# Patient Record
Sex: MALE | URBAN - METROPOLITAN AREA
[De-identification: names, ages, dates, MRNs, and addresses within clinical notes are randomized per-mention and may not be internally consistent; named-entity substitution may affect disease eponyms.]

---

## 2018-03-13 ENCOUNTER — TELEPHONE (OUTPATIENT)
Dept: FAMILY MEDICINE | Facility: CLINIC | Age: 53
End: 2018-03-13

## 2018-03-13 NOTE — TELEPHONE ENCOUNTER
Patient is requesting a refill of Xanax 0.5mg.      Lian Haneysbie    Pharmacy Technician  [facility]

## 2018-03-14 NOTE — TELEPHONE ENCOUNTER
Confirmed with pharmacy this is not correct pt for refill request.  They will resend with correct pt name.   Araceli Walton RN